# Patient Record
Sex: MALE | Race: WHITE | NOT HISPANIC OR LATINO | Employment: UNEMPLOYED | URBAN - METROPOLITAN AREA
[De-identification: names, ages, dates, MRNs, and addresses within clinical notes are randomized per-mention and may not be internally consistent; named-entity substitution may affect disease eponyms.]

---

## 2017-12-10 ENCOUNTER — HOSPITAL ENCOUNTER (EMERGENCY)
Facility: HOSPITAL | Age: 27
Discharge: HOME | End: 2017-12-10
Attending: EMERGENCY MEDICINE | Admitting: EMERGENCY MEDICINE
Payer: COMMERCIAL

## 2017-12-10 ENCOUNTER — APPOINTMENT (EMERGENCY)
Dept: RADIOLOGY | Facility: HOSPITAL | Age: 27
End: 2017-12-10
Payer: COMMERCIAL

## 2017-12-10 VITALS
OXYGEN SATURATION: 100 % | WEIGHT: 222 LBS | HEART RATE: 95 BPM | TEMPERATURE: 97.9 F | RESPIRATION RATE: 16 BRPM | BODY MASS INDEX: 31.78 KG/M2 | SYSTOLIC BLOOD PRESSURE: 143 MMHG | HEIGHT: 70 IN | DIASTOLIC BLOOD PRESSURE: 85 MMHG

## 2017-12-10 DIAGNOSIS — M54.9 ACUTE BACK PAIN: Primary | ICD-10-CM

## 2017-12-10 PROCEDURE — 72080 X-RAY EXAM THORACOLMB 2/> VW: CPT

## 2017-12-10 PROCEDURE — 99283 EMERGENCY DEPT VISIT LOW MDM: CPT

## 2017-12-10 RX ORDER — CYCLOBENZAPRINE HCL 10 MG
10 TABLET ORAL ONCE
Status: COMPLETED | OUTPATIENT
Start: 2017-12-10 | End: 2017-12-10

## 2017-12-10 RX ORDER — CYCLOBENZAPRINE HCL 10 MG
10 TABLET ORAL 2 TIMES DAILY PRN
Qty: 20 TABLET | Refills: 0 | Status: SHIPPED | OUTPATIENT
Start: 2017-12-10 | End: 2021-03-31

## 2017-12-10 RX ORDER — NAPROXEN 500 MG/1
500 TABLET ORAL 2 TIMES DAILY WITH MEALS
Qty: 30 TABLET | Refills: 0 | Status: SHIPPED | OUTPATIENT
Start: 2017-12-10 | End: 2021-03-31

## 2017-12-10 RX ORDER — NAPROXEN 250 MG/1
500 TABLET ORAL ONCE
Status: COMPLETED | OUTPATIENT
Start: 2017-12-10 | End: 2017-12-10

## 2017-12-10 RX ADMIN — CYCLOBENZAPRINE HYDROCHLORIDE 10 MG: 10 TABLET, FILM COATED ORAL at 13:47

## 2017-12-10 RX ADMIN — NAPROXEN 500 MG: 250 TABLET ORAL at 13:47

## 2017-12-10 NOTE — DISCHARGE INSTRUCTIONS
Acute Low Back Pain   WHAT YOU NEED TO KNOW:   Acute low back pain is sudden discomfort in your lower back area that lasts for up to 6 weeks  The discomfort makes it difficult to tolerate activity  DISCHARGE INSTRUCTIONS:   Return to the emergency department if:   · You have severe pain  · You have sudden stiffness and heaviness on both buttocks down to both legs  · You have numbness or weakness in one leg, or pain in both legs  · You have numbness in your genital area or across your lower back  · You cannot control your urine or bowel movements  Contact your healthcare provider if:   · You have a fever  · You have pain at night or when you rest     · Your pain does not get better with treatment  · You have pain that worsens when you cough or sneeze  · You suddenly feel something pop or snap in your back  · You have questions or concerns about your condition or care  Medicines: The following medicines may be ordered by your healthcare provider:  · Acetaminophen  decreases pain  It is available without a doctor's order  Ask how much to take and how often to take it  Follow directions  Acetaminophen can cause liver damage if not taken correctly  · NSAIDs  help decrease swelling and pain  This medicine is available with or without a doctor's order  NSAIDs can cause stomach bleeding or kidney problems in certain people  If you take blood thinner medicine, always ask your healthcare provider if NSAIDs are safe for you  Always read the medicine label and follow directions  · Prescription pain medicine  may be given  Ask your healthcare provider how to take this medicine safely  · Muscle relaxers  decrease pain by relaxing the muscles in your lower spine  · Take your medicine as directed  Contact your healthcare provider if you think your medicine is not helping or if you have side effects  Tell him of her if you are allergic to any medicine   Keep a list of the medicines, vitamins, and herbs you take  Include the amounts, and when and why you take them  Bring the list or the pill bottles to follow-up visits  Carry your medicine list with you in case of an emergency  Self-care:   · Stay active  as much as you can without causing more pain  Bed rest could make your back pain worse  Start with some light exercises such as walking  Avoid heavy lifting until your pain is gone  Ask for more information about the activities or exercises that are right for you  · Ice  helps decrease swelling, pain, and muscle spams  Put crushed ice in a plastic bag  Cover it with a towel  Place it on your lower back for 20 to 30 minutes every 2 hours  Do this for about 2 to 3 days after your pain starts, or as directed  · Heat  helps decrease pain and muscle spasms  Start to use heat after treatment with ice has stopped  Use a small towel dampened with warm water or a heating pad, or sit in a warm bath  Apply heat on the area for 20 to 30 minutes every 2 hours for as many days as directed  Alternate heat and ice  Prevent acute low back pain:   · Use proper body mechanics  ¨ Bend at the hips and knees when you  objects  Do not bend from the waist  Use your leg muscles as you lift the load  Do not use your back  Keep the object close to your chest as you lift it  Try not to twist or lift anything above your waist     ¨ Change your position often when you stand for long periods of time  Rest one foot on a small box or footrest, and then switch to the other foot often  ¨ Try not to sit for long periods of time  When you do, sit in a straight-backed chair with your feet flat on the floor  Never reach, pull, or push while you are sitting  · Do exercises that strengthen your back muscles  Warm up before you exercise  Ask your healthcare provider the best exercises for you  · Maintain a healthy weight  Ask your healthcare provider how much you should weigh   Ask him to help you create a weight loss plan if you are overweight  Follow up with your healthcare provider as directed:  Return for a follow-up visit if you still have pain after 1 to 3 weeks of treatment  You may need to visit an orthopedist if your back pain lasts more than 12 weeks  Write down your questions so you remember to ask them during your visits  © 2017 [employer]  Information is for End User's use only and may not be sold, redistributed or otherwise used for commercial purposes  All illustrations and images included in CareNotes® are the copyrighted property of [employer]  or [employer]ander [employer]  The above information is an  only  It is not intended as medical advice for individual conditions or treatments  Talk to your doctor, nurse or pharmacist before following any medical regimen to see if it is safe and effective for you

## 2020-05-04 ENCOUNTER — TELEMEDICINE (OUTPATIENT)
Dept: FAMILY MEDICINE CLINIC | Facility: CLINIC | Age: 30
End: 2020-05-04
Payer: COMMERCIAL

## 2020-05-04 VITALS — HEIGHT: 70 IN | BODY MASS INDEX: 30.78 KG/M2 | WEIGHT: 215 LBS | TEMPERATURE: 98.1 F

## 2020-05-04 DIAGNOSIS — F40.9 INSOMNIA DUE TO ANXIETY AND FEAR: Primary | ICD-10-CM

## 2020-05-04 DIAGNOSIS — F51.05 INSOMNIA DUE TO ANXIETY AND FEAR: Primary | ICD-10-CM

## 2020-05-04 PROCEDURE — 3008F BODY MASS INDEX DOCD: CPT | Performed by: NURSE PRACTITIONER

## 2020-05-04 PROCEDURE — 99214 OFFICE O/P EST MOD 30 MIN: CPT | Performed by: NURSE PRACTITIONER

## 2020-05-04 RX ORDER — TRAZODONE HYDROCHLORIDE 50 MG/1
50 TABLET ORAL
Qty: 30 TABLET | Refills: 2 | Status: SHIPPED | OUTPATIENT
Start: 2020-05-04 | End: 2021-03-31

## 2020-08-17 ENCOUNTER — APPOINTMENT (EMERGENCY)
Dept: CT IMAGING | Facility: HOSPITAL | Age: 30
End: 2020-08-17
Payer: COMMERCIAL

## 2020-08-17 ENCOUNTER — HOSPITAL ENCOUNTER (EMERGENCY)
Facility: HOSPITAL | Age: 30
Discharge: HOME | End: 2020-08-17
Attending: EMERGENCY MEDICINE | Admitting: EMERGENCY MEDICINE
Payer: COMMERCIAL

## 2020-08-17 VITALS
SYSTOLIC BLOOD PRESSURE: 134 MMHG | DIASTOLIC BLOOD PRESSURE: 75 MMHG | RESPIRATION RATE: 18 BRPM | OXYGEN SATURATION: 100 % | TEMPERATURE: 98 F | HEART RATE: 52 BPM

## 2020-08-17 DIAGNOSIS — S06.9X9A MINOR HEAD INJURY WITH LOSS OF CONSCIOUSNESS, INITIAL ENCOUNTER (HCC): Primary | ICD-10-CM

## 2020-08-17 PROCEDURE — G1004 CDSM NDSC: HCPCS

## 2020-08-17 PROCEDURE — 70450 CT HEAD/BRAIN W/O DYE: CPT

## 2020-08-17 PROCEDURE — 99284 EMERGENCY DEPT VISIT MOD MDM: CPT

## 2020-08-17 PROCEDURE — 99284 EMERGENCY DEPT VISIT MOD MDM: CPT | Performed by: PHYSICIAN ASSISTANT

## 2020-08-17 NOTE — ED PROVIDER NOTES
History  Chief Complaint   Patient presents with    Dizziness     pt reports he fell and struck head yesterday (-LOC,-thinners,-wounds) pt was not evaluated following injury  Begins today with intermittent bouts of nausea and dizziness     Patient with no past medical history, does states he has had "concussions in the past" presents to emergency department 1 day after he fell few feet off a boat trailer that was pulling out, falling backward and hitting right side of head  Patient denies LOC, was able to go out on the boat for about an hour, and then has been acting normal self ; states symptoms of nausea, no vomiting, dizziness and headache despite Tylenol, persisted today so want to get checked out  Prior to Admission Medications   Prescriptions Last Dose Informant Patient Reported? Taking? cyclobenzaprine (FLEXERIL) 10 mg tablet Not Taking at Unknown time  No No   Sig: Take 1 tablet by mouth 2 (two) times a day as needed for muscle spasms   Patient not taking: Reported on 5/4/2020   naproxen (NAPROSYN) 500 mg tablet Not Taking at Unknown time  No No   Sig: Take 1 tablet by mouth 2 (two) times a day with meals   Patient not taking: Reported on 5/4/2020   traZODone (DESYREL) 50 mg tablet Not Taking at Unknown time  No No   Sig: Take 1 tablet (50 mg total) by mouth daily at bedtime   Patient not taking: Reported on 8/17/2020      Facility-Administered Medications: None       History reviewed  No pertinent past medical history  History reviewed  No pertinent surgical history      Family History   Problem Relation Age of Onset    Diabetes Paternal Grandmother     No Known Problems Mother     No Known Problems Father     No Known Problems Sister     No Known Problems Brother     No Known Problems Son     No Known Problems Daughter     No Known Problems Maternal Grandmother     No Known Problems Maternal Grandfather     No Known Problems Paternal Grandfather     No Known Problems Maternal Aunt  No Known Problems Maternal Uncle     No Known Problems Paternal Aunt     No Known Problems Paternal Uncle     No Known Problems Cousin      I have reviewed and agree with the history as documented  E-Cigarette/Vaping    E-Cigarette Use Current Every Day User      E-Cigarette/Vaping Substances    Nicotine Yes     THC No     CBD No     Flavoring No     Other No     Unknown No      Social History     Tobacco Use    Smoking status: Former Smoker     Packs/day: 0 50     Years: 15 00     Pack years: 7 50    Smokeless tobacco: Never Used    Tobacco comment: Has smoked since age:   16 - As per [place]    Substance Use Topics    Alcohol use: Yes     Comment: Moderate, Alcohol-years of use:   2  - As per Karli Case Drug use: No     Comment: Illicit drugs:   never - As per [place]        Review of Systems   Constitutional: Negative for chills and fever  HENT: Negative for hearing loss, rhinorrhea, sore throat and trouble swallowing  Eyes: Negative for visual disturbance  Respiratory: Negative for cough, chest tightness and shortness of breath  Cardiovascular: Negative for chest pain and leg swelling  Gastrointestinal: Positive for nausea  Negative for abdominal pain and vomiting  Genitourinary: Negative for dysuria and frequency  Musculoskeletal: Negative for arthralgias and myalgias  Skin: Negative for color change and pallor  Neurological: Positive for dizziness, light-headedness and headaches  Negative for weakness  Psychiatric/Behavioral: Negative for behavioral problems  Physical Exam  Physical Exam  Vitals signs and nursing note reviewed  Constitutional:       General: He is not in acute distress  Appearance: Normal appearance  He is well-developed  He is not ill-appearing  HENT:      Head: Normocephalic and atraumatic        Right Ear: External ear normal       Left Ear: External ear normal       Mouth/Throat:      Mouth: Mucous membranes are moist       Pharynx: Oropharynx is clear  Eyes:      Extraocular Movements: Extraocular movements intact  Conjunctiva/sclera: Conjunctivae normal       Pupils: Pupils are equal, round, and reactive to light  Neck:      Musculoskeletal: Normal range of motion  No neck rigidity or muscular tenderness  Cardiovascular:      Rate and Rhythm: Normal rate and regular rhythm  Pulmonary:      Effort: Pulmonary effort is normal       Breath sounds: Normal breath sounds  Abdominal:      General: Bowel sounds are normal       Palpations: Abdomen is soft  Musculoskeletal: Normal range of motion  General: No tenderness  Skin:     General: Skin is warm and dry  Findings: No lesion  Neurological:      General: No focal deficit present  Mental Status: He is alert and oriented to person, place, and time  Mental status is at baseline  Cranial Nerves: No cranial nerve deficit  Motor: No weakness        Gait: Gait normal    Psychiatric:         Mood and Affect: Mood normal          Behavior: Behavior normal          Vital Signs  ED Triage Vitals   Temperature Pulse Respirations Blood Pressure SpO2   08/17/20 1337 08/17/20 1337 08/17/20 1337 08/17/20 1342 08/17/20 1337   98 °F (36 7 °C) 71 18 139/81 98 %      Temp Source Heart Rate Source Patient Position - Orthostatic VS BP Location FiO2 (%)   08/17/20 1337 08/17/20 1337 08/17/20 1337 08/17/20 1337 --   Tympanic Monitor Lying Right arm       Pain Score       --                  Vitals:    08/17/20 1337 08/17/20 1342 08/17/20 1421 08/17/20 1548   BP:  139/81 115/65 134/75   Pulse: 71  (!) 54 (!) 52   Patient Position - Orthostatic VS: Lying Lying           Visual Acuity  Visual Acuity      Most Recent Value   L Pupil Size (mm)  3   R Pupil Size (mm)  3          ED Medications  Medications - No data to display    Diagnostic Studies  Results Reviewed     None                 CT head without contrast   Final Result by Ramon Fowler MD (08/17 1520)      No acute intracranial abnormality  Workstation performed: [id number]                    Procedures  ECG 12 Lead Documentation Only    Date/Time: 8/17/2020 4:08 PM  Performed by: Kel Contreras PA-C  Authorized by: Kel Contreras PA-C     ECG reviewed by me, the ED Provider: yes    Patient location:  ED  Interpretation:     Interpretation: normal    Rate:     ECG rate:  64  Rhythm:     Rhythm: sinus rhythm    Comments:      NSR @ 64 no acute ischemic changes             ED Course       US AUDIT      Most Recent Value   Initial Alcohol Screen: US AUDIT-C    1  How often do you have a drink containing alcohol? 3 Filed at: 08/17/2020 1338   2  How many drinks containing alcohol do you have on a typical day you are drinking? 2 Filed at: 08/17/2020 1338   3a  Male UNDER 65: How often do you have five or more drinks on one occasion? 0 Filed at: 08/17/2020 1338   3b  FEMALE Any Age, or MALE 65+: How often do you have 4 or more drinks on one occassion? 0 Filed at: 08/17/2020 1338   Audit-C Score  5 Filed at: 08/17/2020 1338                  ARCHIE/DAST-10      Most Recent Value   How many times in the past year have you    Used an illegal drug or used a prescription medication for non-medical reasons? Never Filed at: 08/17/2020 1338                                MDM      Disposition  Final diagnoses:   Minor head injury with loss of consciousness, initial encounter [facility])     Time reflects when diagnosis was documented in both MDM as applicable and the Disposition within this note     Time User Action Codes Description Comment    8/17/2020  4:07 PM Trinity Conteh Add [L23 [id number]] Minor head injury with loss of consciousness, initial encounter [facility])       ED Disposition     ED Disposition Condition Date/Time Comment    Discharge Stable Mon Aug 17, 2020  4:07 PM Kayden Lassiter discharge to home/self care              Follow-up Information     Follow up With Specialties Details Why Contact Tino Conroy Tab Cronin Nurse Practitioner, Family Medicine  As needed  [address]  [address] [facility] [address] [phone]            Patient's Medications   Discharge Prescriptions    No medications on file     No discharge procedures on file      PDMP Review     None          ED Provider  Electronically Signed by           Zachary Up PA-C  08/17/20 5265

## 2020-08-18 ENCOUNTER — TELEPHONE (OUTPATIENT)
Dept: FAMILY MEDICINE CLINIC | Facility: CLINIC | Age: 30
End: 2020-08-18

## 2020-08-18 NOTE — TELEPHONE ENCOUNTER
Called pt in regards to his recent ER visit  We were following up to see how he is doing and get him scheduled for a follow up visit in office  L/M   Chris Phan

## 2021-03-31 ENCOUNTER — OFFICE VISIT (OUTPATIENT)
Dept: FAMILY MEDICINE CLINIC | Facility: CLINIC | Age: 31
End: 2021-03-31
Payer: COMMERCIAL

## 2021-03-31 VITALS
SYSTOLIC BLOOD PRESSURE: 130 MMHG | HEIGHT: 70 IN | TEMPERATURE: 97.5 F | DIASTOLIC BLOOD PRESSURE: 90 MMHG | WEIGHT: 248.4 LBS | BODY MASS INDEX: 35.56 KG/M2 | RESPIRATION RATE: 16 BRPM | HEART RATE: 92 BPM | OXYGEN SATURATION: 98 %

## 2021-03-31 DIAGNOSIS — K12.1 STOMATITIS AND MUCOSITIS: ICD-10-CM

## 2021-03-31 DIAGNOSIS — Z72.0 TOBACCO USER: ICD-10-CM

## 2021-03-31 DIAGNOSIS — K12.30 STOMATITIS AND MUCOSITIS: ICD-10-CM

## 2021-03-31 DIAGNOSIS — F19.20 DRUG ABUSE AND DEPENDENCE (HCC): ICD-10-CM

## 2021-03-31 DIAGNOSIS — Z00.00 ENCOUNTER FOR ANNUAL PHYSICAL EXAM: Primary | ICD-10-CM

## 2021-03-31 PROCEDURE — 99395 PREV VISIT EST AGE 18-39: CPT | Performed by: FAMILY MEDICINE

## 2021-03-31 PROCEDURE — 3725F SCREEN DEPRESSION PERFORMED: CPT | Performed by: FAMILY MEDICINE

## 2021-03-31 PROCEDURE — 3008F BODY MASS INDEX DOCD: CPT | Performed by: FAMILY MEDICINE

## 2021-03-31 PROCEDURE — 99213 OFFICE O/P EST LOW 20 MIN: CPT | Performed by: FAMILY MEDICINE

## 2021-03-31 RX ORDER — GABAPENTIN 400 MG/1
400 CAPSULE ORAL 3 TIMES DAILY
COMMUNITY
Start: 2021-03-22 | End: 2021-04-15

## 2021-03-31 RX ORDER — BUPRENORPHINE AND NALOXONE 12; 3 MG/1; MG/1
FILM, SOLUBLE BUCCAL; SUBLINGUAL
COMMUNITY
Start: 2021-03-23 | End: 2021-04-15

## 2021-03-31 NOTE — PROGRESS NOTES
Assessment/Plan:    No problem-specific Assessment & Plan notes found for this encounter  Diagnoses and all orders for this visit:    Encounter for annual physical exam  BMI Counseling: Body mass index is 35 64 kg/m²  The BMI is above normal  Nutrition recommendations include decreasing portion sizes, encouraging healthy choices of fruits and vegetables, decreasing fast food intake, consuming healthier snacks, limiting drinks that contain sugar, moderation in carbohydrate intake and increasing intake of lean protein  Exercise recommendations include exercising 3-5 times per week  No pharmacotherapy was ordered  Depression Screening and Follow-up Plan: Patient with concominant drug abuse who was recently discharged from care of [facility]  Provided resources to [facility] with new counselor/prescriber for his mood disorder and drug addiction/dependence  he declines labs for screening purposes  Immunization History   Administered Date(s) Administered    Tdap 03/01/2012     Vaccines are up to date  Tobacco user  Discussed smoking cessation  Drug abuse and dependence  Patient previously used heroin and fentanyl  Has been clean since April of 2020  Was recently discharged from [facility] where he was receiving suboxone treatment  I called [facility] who provided phone number for [facility] rehab services   Then called [facility] rehab services  They do not prescribe suboxone  Did however give me names and phone numbers for 3 resources who do prescribe--[facility] in [place], [facility] in [place] and [facility] [employer]  Stomatitis and mucositis  -     al mag oxide-diphenhydramine-lidocaine viscous (MAGIC MOUTHWASH) 1:1:1 suspension; Swish and spit 10 mL every 4 (four) hours as needed for mouth pain or discomfort  ?  Traumatic  Will give him rx for magic mouthwash to help alleviate discomfort  Advised patient to call or return to office if this does not heal up as he may need to see oral surgery for biopsy given his smoking history  He is agreeable to this plan  Other orders  -     gabapentin (NEURONTIN) 400 mg capsule; Take 400 mg by mouth 3 (three) times a day  -     buprenorphine-naloxone (Suboxone) 12-3 MG; DISSOLVE 1 FILM UNDER TONGUE ONCE A DAY  -     NON FORMULARY; Medical marijuana          Subjective:      Patient ID: Jann Chu is a 35 y o  male who presents today as a new patient to this office for physical exam and to establish care  The main reason he came today is to get a suboxone rx  Has history of drug abuse, depression, anxiety and PTSD  Previously used both heroin and fentanyl  Clean since April 17 of 2020    He was previously being followed by [facility] but was discharged from their care due to missed appointments  His suboxone and medical marijuana were prescribed through their office  He does not exercise  He does eat fruits and veggies  Is currently not working and lives with his parents who are supportive  He has not had any labs done for screening purposes and declines HIV testing and screening lipid and glucose panels  Had not seen dentist in a long while  Has sore on inside of right cheek  Started yesterday  Is sore  Thinks maybe he bit his cheek  HPI    The following portions of the patient's history were reviewed and updated as appropriate: He  has a past medical history of Anxiety, COVID-19 (06/2020), Depression, Drug abuse and dependence ([facility] [address] ), and PTSD (post-traumatic stress disorder)  He  has no past surgical history on file  He  reports that he has been smoking  He has a 16 00 pack-year smoking history  He has never used smokeless tobacco  He reports current alcohol use of about 3 0 standard drinks of alcohol per week  He reports previous drug use  Drugs: Heroin, Fentanyl, and Marijuana    Current Outpatient Medications on File Prior to Visit   Medication Sig    buprenorphine-naloxone (Suboxone) 12-3 MG DISSOLVE 1 FILM UNDER TONGUE ONCE A DAY    gabapentin (NEURONTIN) 400 mg capsule Take 400 mg by mouth 3 (three) times a day    NON FORMULARY Medical marijuana    [DISCONTINUED] cyclobenzaprine (FLEXERIL) 10 mg tablet Take 1 tablet by mouth 2 (two) times a day as needed for muscle spasms (Patient not taking: Reported on 5/4/2020)    [DISCONTINUED] naproxen (NAPROSYN) 500 mg tablet Take 1 tablet by mouth 2 (two) times a day with meals (Patient not taking: Reported on 5/4/2020)    [DISCONTINUED] traZODone (DESYREL) 50 mg tablet Take 1 tablet (50 mg total) by mouth daily at bedtime (Patient not taking: Reported on 8/17/2020)     No current facility-administered medications on file prior to visit  He has No Known Allergies       Review of Systems      Objective:      /90 (BP Location: Left arm, Patient Position: Sitting, Cuff Size: Large)   Pulse 92   Temp 97 5 °F (36 4 °C) (Temporal)   Resp 16   Ht 5' 10" (1 778 m)   Wt 113 kg (248 lb 6 4 oz)   SpO2 98%   BMI 35 64 kg/m²          Physical Exam  Vitals signs and nursing note reviewed  Constitutional:       General: He is not in acute distress  Appearance: Normal appearance  He is obese  He is not ill-appearing, toxic-appearing or diaphoretic  HENT:      Head: Normocephalic and atraumatic  Right Ear: Tympanic membrane normal       Left Ear: Tympanic membrane normal       Nose: Nose normal       Mouth/Throat:      Mouth: Mucous membranes are moist       Pharynx: No oropharyngeal exudate or posterior oropharyngeal erythema  Comments: Right buccal mucosa with small shallow ulcerated lesion adjacent to back molar  Eyes:      Extraocular Movements: Extraocular movements intact  Conjunctiva/sclera: Conjunctivae normal       Pupils: Pupils are equal, round, and reactive to light  Neck:      Musculoskeletal: Normal range of motion  Cardiovascular:      Rate and Rhythm: Normal rate and regular rhythm        Heart sounds: No murmur  Pulmonary:      Effort: Pulmonary effort is normal       Breath sounds: Normal breath sounds  Abdominal:      General: Bowel sounds are normal       Palpations: Abdomen is soft  There is no mass  Tenderness: There is no abdominal tenderness  Musculoskeletal:      Right lower leg: No edema  Left lower leg: No edema  Lymphadenopathy:      Cervical: No cervical adenopathy  Skin:     General: Skin is warm  Comments: Scabbed lesion left cheek   Neurological:      General: No focal deficit present  Mental Status: He is alert        Deep Tendon Reflexes: Reflexes normal    Psychiatric:         Mood and Affect: Mood normal

## 2021-03-31 NOTE — PATIENT INSTRUCTIONS
[facility] [phone]  UJBXG [id number] ([place]) [phone]  Joanie Amaya) [phone]    For assessment and treatment (do not prescribe suboxone) can call [address] [facility] [phone]

## 2021-04-15 ENCOUNTER — TELEMEDICINE (OUTPATIENT)
Dept: FAMILY MEDICINE CLINIC | Facility: CLINIC | Age: 31
End: 2021-04-15
Payer: COMMERCIAL

## 2021-04-15 VITALS — HEIGHT: 70 IN | BODY MASS INDEX: 35.5 KG/M2 | WEIGHT: 248 LBS | TEMPERATURE: 97.9 F

## 2021-04-15 DIAGNOSIS — R68.83 CHILLS: Primary | ICD-10-CM

## 2021-04-15 DIAGNOSIS — R68.83 CHILLS: ICD-10-CM

## 2021-04-15 PROCEDURE — 99213 OFFICE O/P EST LOW 20 MIN: CPT | Performed by: FAMILY MEDICINE

## 2021-04-15 PROCEDURE — 3008F BODY MASS INDEX DOCD: CPT | Performed by: FAMILY MEDICINE

## 2021-04-15 PROCEDURE — U0005 INFEC AGEN DETEC AMPLI PROBE: HCPCS | Performed by: FAMILY MEDICINE

## 2021-04-15 PROCEDURE — U0003 INFECTIOUS AGENT DETECTION BY NUCLEIC ACID (DNA OR RNA); SEVERE ACUTE RESPIRATORY SYNDROME CORONAVIRUS 2 (SARS-COV-2) (CORONAVIRUS DISEASE [COVID-19]), AMPLIFIED PROBE TECHNIQUE, MAKING USE OF HIGH THROUGHPUT TECHNOLOGIES AS DESCRIBED BY CMS-2020-01-R: HCPCS | Performed by: FAMILY MEDICINE

## 2021-04-15 PROCEDURE — 3725F SCREEN DEPRESSION PERFORMED: CPT | Performed by: FAMILY MEDICINE

## 2021-04-15 NOTE — PROGRESS NOTES
COVID-19 Outpatient Progress Note    Assessment/Plan:    Problem List Items Addressed This Visit     None      Visit Diagnoses     Chills    -  Primary    Relevant Orders    Novel Coronavirus (Covid-19),PCR [facility] - Collected at Mobile Vans or [facility]         Disposition:     I referred patient to one of our centralized sites for a COVID-19 swab  Patient to isolate until his test results  If the test is negative, would recommend he go for some additional labs including cbc and tsh  He is agreeable  I have spent 10 minutes directly with the patient  Encounter provider Kraig Colin DO    Provider located at [address] [place] [id number] [phone]    Recent Visits  No visits were found meeting these conditions  Showing recent visits within past 7 days and meeting all other requirements     Today's Visits  Date Type Provider Dept   04/15/21 Telemedicine Kraig Colin, [address]  today's visits and meeting all other requirements     Future Appointments  No visits were found meeting these conditions  Showing future appointments within next 150 days and meeting all other requirements      This virtual check-in was done via [employer] and patient was informed that this is a secure, HIPAA-compliant platform  He agrees to proceed  Patient agrees to participate in a virtual check in via telephone or video visit instead of presenting to the office to address urgent/immediate medical needs  Patient is aware this is a billable service  After connecting through [facility], the patient was identified by name and date of birth  Rupesh Young was informed that this was a telemedicine visit and that the exam was being conducted confidentially over secure lines  My office door was closed  No one else was in the room   Rupesh Young acknowledged consent and understanding of privacy and security of the telemedicine visit  I informed the patient that I have reviewed his record in Epic and presented the opportunity for him to ask any questions regarding the visit today  The patient agreed to participate  Subjective:   Deepti Rincon is a 35 y o  male who is concerned about COVID-19  Patient's symptoms include chills  Patient denies fever, fatigue, malaise, congestion, rhinorrhea, sore throat, anosmia, loss of taste, cough, shortness of breath, chest tightness, abdominal pain, nausea, vomiting, diarrhea, myalgias and headaches  Date of symptom onset: 4/13/2021    Exposure:   Contact with a person who is under investigation (PUI) for or who is positive for COVID-19 within the last 14 days?: No    Hospitalized recently for fever and/or lower respiratory symptoms?: No      Currently a healthcare worker that is involved in direct patient care?: No      Works in a special setting where the risk of COVID-19 transmission may be high? (this may include long-term care, correctional and FPC facilities; homeless shelters; assisted-living facilities and group homes ): No      Resident in a special setting where the risk of COVID-19 transmission may be high? (this may include long-term care, correctional and FPC facilities; homeless shelters; assisted-living facilities and group homes ): No      Complaining of feeling chilled for the last 2 days  No fever at all  He denies any rhinorrhea, congestion, cough or shortness of breath  No skin rash  No GI sx  Had to leave work because he felt so cold  No covid exposure that he is aware of  Did have covid in June of last year  Did not contact anyone regarding his suboxone  Is doing without and seems to be okay   Chills he is feeling now do not feel like what he has experienced in the past with withdrawal      No results found for: Kimberly Balderas, RODRIGO, [address]  Past Medical History:   Diagnosis Date    Anxiety     COVID-19 06/2020    Depression     Drug abuse and dependence (HCC)     PTSD (post-traumatic stress disorder)      History reviewed  No pertinent surgical history  Current Outpatient Medications   Medication Sig Dispense Refill    NON FORMULARY Medical marijuana       No current facility-administered medications for this visit  No Known Allergies    Review of Systems   Constitutional: Positive for chills  Negative for fatigue and fever  HENT: Negative for congestion, rhinorrhea and sore throat  Respiratory: Negative for cough, chest tightness and shortness of breath  Gastrointestinal: Negative for abdominal pain, diarrhea, nausea and vomiting  Musculoskeletal: Negative for myalgias  Neurological: Negative for headaches  Objective:    Vitals:    04/15/21 1303   Temp: 97 9 °F (36 6 °C)   TempSrc: Temporal   Weight: 112 kg (248 lb)   Height: 5' 10" (1 778 m)       Physical Exam  Nursing note reviewed  Constitutional:       General: He is not in acute distress  Appearance: Normal appearance  He is not ill-appearing, toxic-appearing or diaphoretic  HENT:      Head: Normocephalic and atraumatic  Eyes:      Conjunctiva/sclera: Conjunctivae normal    Pulmonary:      Effort: Pulmonary effort is normal  No respiratory distress  Neurological:      Mental Status: He is alert  Psychiatric:         Mood and Affect: Mood normal        VIRTUAL VISIT DISCLAIMER    Russel Mix acknowledges that he has consented to an online visit or consultation  He understands that the online visit is based solely on information provided by him, and that, in the absence of a face-to-face physical evaluation by the physician, the diagnosis he receives is both limited and provisional in terms of accuracy and completeness  This is not intended to replace a full medical face-to-face evaluation by the physician  Russel Mix understands and accepts these terms

## 2021-04-16 ENCOUNTER — LAB (OUTPATIENT)
Dept: LAB | Facility: HOSPITAL | Age: 31
End: 2021-04-16
Attending: FAMILY MEDICINE
Payer: COMMERCIAL

## 2021-04-16 ENCOUNTER — TELEPHONE (OUTPATIENT)
Dept: FAMILY MEDICINE CLINIC | Facility: CLINIC | Age: 31
End: 2021-04-16

## 2021-04-16 DIAGNOSIS — R68.83 CHILLS (WITHOUT FEVER): Primary | ICD-10-CM

## 2021-04-16 DIAGNOSIS — R68.83 CHILLS (WITHOUT FEVER): ICD-10-CM

## 2021-04-16 LAB
BASOPHILS # BLD AUTO: 0.03 THOUSANDS/ΜL (ref 0–0.1)
BASOPHILS NFR BLD AUTO: 1 % (ref 0–1)
EOSINOPHIL # BLD AUTO: 0.04 THOUSAND/ΜL (ref 0–0.61)
EOSINOPHIL NFR BLD AUTO: 1 % (ref 0–6)
ERYTHROCYTE [DISTWIDTH] IN BLOOD BY AUTOMATED COUNT: 12.5 % (ref 11.6–15.1)
HCT VFR BLD AUTO: 47.7 % (ref 36.5–49.3)
HGB BLD-MCNC: 16.6 G/DL (ref 12–17)
IMM GRANULOCYTES # BLD AUTO: 0.01 THOUSAND/UL (ref 0–0.2)
IMM GRANULOCYTES NFR BLD AUTO: 0 % (ref 0–2)
LYMPHOCYTES # BLD AUTO: 2.08 THOUSANDS/ΜL (ref 0.6–4.47)
LYMPHOCYTES NFR BLD AUTO: 39 % (ref 14–44)
MCH RBC QN AUTO: 29.1 PG (ref 26.8–34.3)
MCHC RBC AUTO-ENTMCNC: 34.8 G/DL (ref 31.4–37.4)
MCV RBC AUTO: 84 FL (ref 82–98)
MONOCYTES # BLD AUTO: 0.37 THOUSAND/ΜL (ref 0.17–1.22)
MONOCYTES NFR BLD AUTO: 7 % (ref 4–12)
NEUTROPHILS # BLD AUTO: 2.8 THOUSANDS/ΜL (ref 1.85–7.62)
NEUTS SEG NFR BLD AUTO: 52 % (ref 43–75)
PLATELET # BLD AUTO: 274 THOUSANDS/UL (ref 149–390)
PMV BLD AUTO: 9.9 FL (ref 8.9–12.7)
RBC # BLD AUTO: 5.7 MILLION/UL (ref 3.88–5.62)
SARS-COV-2 RNA RESP QL NAA+PROBE: NEGATIVE
TSH SERPL DL<=0.05 MIU/L-ACNC: 0.91 UIU/ML (ref 0.34–5.6)
WBC # BLD AUTO: 5.33 THOUSAND/UL (ref 4.31–10.16)

## 2021-04-16 PROCEDURE — 84443 ASSAY THYROID STIM HORMONE: CPT

## 2021-04-16 PROCEDURE — 36415 COLL VENOUS BLD VENIPUNCTURE: CPT

## 2021-04-16 PROCEDURE — 85025 COMPLETE CBC W/AUTO DIFF WBC: CPT

## 2021-04-16 NOTE — RESULT ENCOUNTER NOTE
Please call patient to let him know that his cbc and thyroid were okay  The red blood cell count was very slightly elevated /out of range and is nothing to worry about, nor is it contributing to his chills  I wonder if the chills he feels may be related to his coming off of his suboxone though not typical opiod withdrawal sx

## 2021-04-16 NOTE — RESULT ENCOUNTER NOTE
Spoke to pt made him aware that labs are normal besides the slightly elevated rbc  The chills are most likely due to coming off the suboxone and the pt felt the same   I did make him aware that if anything were to change and he develops more symptoms or does not get better he should go to ER or care now to be evaluated

## 2021-04-16 NOTE — TELEPHONE ENCOUNTER
Patient saw his results on my chart and would like to talk with [facility] about his bloodwork results      Can you return patients phone call at  [phone]

## 2021-04-16 NOTE — RESULT ENCOUNTER NOTE
Called pt and advised that dr Denise Hall does not have any recommendations at this time due to the fact that he only has the chills  She wanted him to have the bloodwork done so she can evaluate his levels and go from there  Pt stated he already had it done   So we will await results and get back to him

## 2021-04-16 NOTE — RESULT ENCOUNTER NOTE
Please let patient know that his covid test was negative  I will order labwork to check this thyroid

## 2021-04-16 NOTE — RESULT ENCOUNTER NOTE
Given that he has no other symptoms other than the chills, I really have no other recommendations at this point    Will await results of cbc and thyroid  If he develops any other symptoms, including fever, should call  If sx worsen over weekend, to urgent care for evaluation

## 2021-07-06 ENCOUNTER — OFFICE VISIT (OUTPATIENT)
Dept: FAMILY MEDICINE CLINIC | Facility: CLINIC | Age: 31
End: 2021-07-06
Payer: COMMERCIAL

## 2021-07-06 VITALS
HEIGHT: 70 IN | RESPIRATION RATE: 16 BRPM | SYSTOLIC BLOOD PRESSURE: 122 MMHG | WEIGHT: 243.2 LBS | DIASTOLIC BLOOD PRESSURE: 100 MMHG | TEMPERATURE: 97.7 F | BODY MASS INDEX: 34.82 KG/M2 | HEART RATE: 78 BPM | OXYGEN SATURATION: 98 %

## 2021-07-06 DIAGNOSIS — B35.3 TINEA PEDIS OF BOTH FEET: Primary | ICD-10-CM

## 2021-07-06 DIAGNOSIS — F19.20 DRUG ABUSE AND DEPENDENCE (HCC): ICD-10-CM

## 2021-07-06 DIAGNOSIS — Z72.0 TOBACCO USER: ICD-10-CM

## 2021-07-06 DIAGNOSIS — R03.0 ELEVATED BLOOD PRESSURE READING WITHOUT DIAGNOSIS OF HYPERTENSION: ICD-10-CM

## 2021-07-06 PROCEDURE — 3008F BODY MASS INDEX DOCD: CPT | Performed by: FAMILY MEDICINE

## 2021-07-06 PROCEDURE — 3725F SCREEN DEPRESSION PERFORMED: CPT | Performed by: FAMILY MEDICINE

## 2021-07-06 PROCEDURE — 99213 OFFICE O/P EST LOW 20 MIN: CPT | Performed by: FAMILY MEDICINE

## 2021-07-06 RX ORDER — BUPRENORPHINE HYDROCHLORIDE AND NALOXONE HYDROCHLORIDE DIHYDRATE 8; 2 MG/1; MG/1
1 TABLET SUBLINGUAL 2 TIMES DAILY
COMMUNITY
End: 2022-06-14

## 2021-07-06 NOTE — PROGRESS NOTES
Assessment/Plan:    No problem-specific Assessment & Plan notes found for this encounter  Diagnoses and all orders for this visit:    Tinea pedis of both feet  -     econazole nitrate 1 % cream; Apply topically daily  Keep feet clean and dry  Apply econazole once daily to feet for 2 weeks  Drug abuse and dependence [facility])  Will reprint the list of resources for him that I had given him at visit in March  I also advised him to call phone number on back of his insurance card where it says "for mental health and substance abuse" to get list of participating providers  Tobacco user  Discussed smoking cessation  Elevated blood pressure reading without diagnosis of hypertension  bp elevated in office this AM    Low sodium diet  Decrease alcohol consumption  Recheck bp in 1 month  Other orders  -     buprenorphine-naloxone (SUBOXONE) 8-2 mg per SL tablet; Place 1 tablet under the tongue daily          Subjective:      Patient ID: Shira Fong is a 35 y o  male with history of drug (heroin and fentanyl) abuse, depression, anxiety, PTSD here today for complaint of "athlete's foot"  Feet dry, itchy and peeling between toes and on bottom of feet  Ongoing for quite awhile  Wears black socks and boots to work every day  Using otc lotrimin  Seen as new patient for PE in March at which time he reported that he had previously been following with [facility] for suboxone prescriptions but was discharged from their care due to missed appointments  He declined screening labs including HIV testing  He was provided with resources for new psychiatrist for his mood disorder and substance abuse  He states that he has called a couple of places but the wait time was too long so he just never made an appointment  Is currently getting suboxone "off the street"  Wants phone numbers again  Had soreness in his mouth when I saw him last time  It resolved entirely       HPI    The following portions of the patient's history were reviewed and updated as appropriate:   He  has a past medical history of Anxiety, COVID-19 (06/2020), Depression, Drug abuse and dependence ([facility] [address] ), and PTSD (post-traumatic stress disorder)  He  has no past surgical history on file  He  reports that he has been smoking  He has a 16 00 pack-year smoking history  He has never used smokeless tobacco  He reports current alcohol use of about 3 0 standard drinks of alcohol per week  He reports previous drug use  Drugs: Heroin, Fentanyl, and Marijuana  Current Outpatient Medications on File Prior to Visit   Medication Sig    buprenorphine-naloxone (SUBOXONE) 8-2 mg per SL tablet Place 1 tablet under the tongue daily   [address] marijuana     No current facility-administered medications on file prior to visit  He has No Known Allergies       Review of Systems      Objective:      /100 (BP Location: Left arm, Patient Position: Sitting, Cuff Size: Large)   Pulse 78   Temp 97 7 °F (36 5 °C) (Temporal)   Resp 16   Ht 5' 10" (1 778 m)   Wt 110 kg (243 lb 3 2 oz)   SpO2 98%   BMI 34 90 kg/m²          Physical Exam  Vitals and nursing note reviewed  Constitutional:       General: He is not in acute distress  Appearance: Normal appearance  He is not ill-appearing, toxic-appearing or diaphoretic  HENT:      Head: Normocephalic  Cardiovascular:      Rate and Rhythm: Normal rate and regular rhythm  Pulses: Normal pulses  Pulmonary:      Effort: Pulmonary effort is normal       Breath sounds: Normal breath sounds  Musculoskeletal:      Cervical back: Normal range of motion  Lymphadenopathy:      Cervical: No cervical adenopathy  Skin:     Comments: Skin between toes bilateral feet dry and peeling  No rash on plantar surface of feet   Neurological:      General: No focal deficit present  Mental Status: He is alert     Psychiatric:         Mood and Affect: Mood normal

## 2021-08-03 ENCOUNTER — OFFICE VISIT (OUTPATIENT)
Dept: FAMILY MEDICINE CLINIC | Facility: CLINIC | Age: 31
End: 2021-08-03
Payer: COMMERCIAL

## 2021-08-03 VITALS
HEART RATE: 84 BPM | TEMPERATURE: 98.2 F | SYSTOLIC BLOOD PRESSURE: 122 MMHG | BODY MASS INDEX: 34.33 KG/M2 | HEIGHT: 70 IN | WEIGHT: 239.8 LBS | RESPIRATION RATE: 16 BRPM | OXYGEN SATURATION: 99 % | DIASTOLIC BLOOD PRESSURE: 90 MMHG

## 2021-08-03 DIAGNOSIS — B07.0 PLANTAR WART OF BOTH FEET: ICD-10-CM

## 2021-08-03 DIAGNOSIS — R03.0 ELEVATED BLOOD-PRESSURE READING, WITHOUT DIAGNOSIS OF HYPERTENSION: Primary | ICD-10-CM

## 2021-08-03 PROCEDURE — 3008F BODY MASS INDEX DOCD: CPT | Performed by: FAMILY MEDICINE

## 2021-08-03 PROCEDURE — 3725F SCREEN DEPRESSION PERFORMED: CPT | Performed by: FAMILY MEDICINE

## 2021-08-03 PROCEDURE — 99213 OFFICE O/P EST LOW 20 MIN: CPT | Performed by: FAMILY MEDICINE

## 2021-08-03 NOTE — PROGRESS NOTES
Assessment/Plan:    No problem-specific Assessment & Plan notes found for this encounter  Diagnoses and all orders for this visit:    Elevated blood-pressure reading, without diagnosis of hypertension  bp improved  Continue efforts at lifestyle modification (reduction of sodium in diet, exercise)  Plantar wart of both feet  -     Ambulatory referral to Podiatry; Future  Referral to podiatry for large plantar warts both feet  Subjective:      Patient ID: Lorraine Stiles is a 35 y o  male here today for f/u on his blood pressure  He was seen on 7/6/21 for complaint of athlete's foot and bp was noted to be elevated at 122/100  He was advised to follow low sodium diet, decrease alcohol consumption and return in 1 month for bp check  Admits that he has cut down on etoh consumption but still using a lot of salt in diet    Was able to get on wait list at [facility] and was told they will reach out to him in a week or two  States that the athlete's foot resolved entirely  States that he has some callous on bottom of feet that bother him when he walks  HPI    The following portions of the patient's history were reviewed and updated as appropriate:   He  has a past medical history of Anxiety, COVID-19 (06/2020), Depression, Drug abuse and dependence ([facility] [address] ), and PTSD (post-traumatic stress disorder)  He  has no past surgical history on file  He  reports that he has been smoking  He has a 16 00 pack-year smoking history  He has never used smokeless tobacco  He reports current alcohol use of about 3 0 standard drinks of alcohol per week  He reports previous drug use  Drugs: Heroin, Fentanyl, and Marijuana    Current Outpatient Medications on File Prior to Visit   Medication Sig    buprenorphine-naloxone (SUBOXONE) 8-2 mg per SL tablet Place 1 tablet under the tongue daily    NON FORMULARY Medical marijuana    econazole nitrate 1 % cream Apply topically daily (Patient not taking: Reported on 8/3/2021)     No current facility-administered medications on file prior to visit  He has No Known Allergies       Review of Systems      Objective:      /90 (BP Location: Left arm, Patient Position: Sitting, Cuff Size: Large)   Pulse 84   Temp 98 2 °F (36 8 °C) (Temporal)   Resp 16   Ht 5' 10" (1 778 m)   Wt 109 kg (239 lb 12 8 oz)   SpO2 99%   BMI 34 41 kg/m²          Physical Exam  Vitals and nursing note reviewed  Constitutional:       Appearance: Normal appearance  He is obese  HENT:      Head: Normocephalic and atraumatic  Eyes:      Conjunctiva/sclera: Conjunctivae normal    Cardiovascular:      Rate and Rhythm: Normal rate and regular rhythm  Pulmonary:      Effort: Pulmonary effort is normal       Breath sounds: Normal breath sounds  Musculoskeletal:      Cervical back: Normal range of motion  Skin:     Comments: Bilateral feet with verruca and overlying callous on plantar surface at distal 4th metatarsal    Neurological:      General: No focal deficit present  Mental Status: He is alert     Psychiatric:         Mood and Affect: Mood normal

## 2021-08-06 ENCOUNTER — TELEPHONE (OUTPATIENT)
Dept: FAMILY MEDICINE CLINIC | Facility: CLINIC | Age: 31
End: 2021-08-06

## 2021-08-06 NOTE — TELEPHONE ENCOUNTER
Pt came in for appt mistakenly was supposed to be at Dr Nuvia Tolliver office today   I called and rescheduled him for Weds at [facility]

## 2022-04-07 NOTE — ED PROVIDER NOTES
History  Chief Complaint   Patient presents with    Back Pain     Patient presents with lower back pain, denies injury  21-year-old male comes in complaining of back pain  Patient denies any injury however uses 2 days ago he began to have mid back pain that radiates into his lower back  No loss of bowel or bladder function pain does not radiate down the legs  Patient has no history of the same  Took some ibuprofen with some mild relief  Of note patient is standing up watching TV and walking around the room when I enter  Patient does not appear to be in any distress at all  History provided by:  Patient, parent and significant other   used: No    Back Pain   Location:  Lumbar spine  Quality:  Aching  Radiates to:  Does not radiate  Pain severity:  Mild  Pain is:  Same all the time  Onset quality:  Sudden  Duration:  2 days  Timing:  Constant  Progression:  Unchanged  Chronicity:  New  Context: not falling, not occupational injury and not twisting    Ineffective treatments:  Ibuprofen  Associated symptoms: no abdominal pain, no bladder incontinence, no bowel incontinence, no chest pain, no fever, no headaches, no pelvic pain, no perianal numbness, no tingling and no weakness    Risk factors: no hx of cancer, not obese and no vascular disease        None       History reviewed  No pertinent past medical history  History reviewed  No pertinent surgical history  History reviewed  No pertinent family history  I have reviewed and agree with the history as documented  Social History   Substance Use Topics    Smoking status: Current Every Day Smoker    Smokeless tobacco: Never Used    Alcohol use Yes        Review of Systems   Constitutional: Negative for activity change, appetite change and fever  HENT: Negative for congestion and facial swelling  Eyes: Negative for discharge and redness  Respiratory: Negative for cough and wheezing      Cardiovascular: Negative for chest pain and leg swelling  Gastrointestinal: Negative for abdominal distention, abdominal pain, blood in stool and bowel incontinence  Endocrine: Negative for cold intolerance and polydipsia  Genitourinary: Negative for bladder incontinence, difficulty urinating, hematuria and pelvic pain  Musculoskeletal: Positive for back pain  Negative for arthralgias and gait problem  Skin: Negative for color change and rash  Allergic/Immunologic: Negative for food allergies and immunocompromised state  Neurological: Negative for dizziness, tingling, seizures, weakness and headaches  Hematological: Negative for adenopathy  Does not bruise/bleed easily  Psychiatric/Behavioral: Negative for agitation, confusion and decreased concentration  All other systems reviewed and are negative  Physical Exam  ED Triage Vitals [12/10/17 1133]   Temperature Pulse Respirations Blood Pressure SpO2   97 9 °F (36 6 °C) 95 16 143/85 100 %      Temp Source Heart Rate Source Patient Position - Orthostatic VS BP Location FiO2 (%)   Oral Monitor -- -- --      Pain Score       8           Orthostatic Vital Signs  Vitals:    12/10/17 1133   BP: 143/85   Pulse: 95       Physical Exam   Constitutional: He is oriented to person, place, and time  He appears well-developed and well-nourished  Non-toxic appearance  HENT:   Head: Normocephalic and atraumatic  Right Ear: Tympanic membrane normal    Left Ear: Tympanic membrane normal    Nose: Nose normal    Mouth/Throat: Oropharynx is clear and moist    Eyes: Conjunctivae, EOM and lids are normal  Pupils are equal, round, and reactive to light  Neck: Trachea normal and normal range of motion  Neck supple  No JVD present  Carotid bruit is not present  Cardiovascular: Normal rate, regular rhythm, normal heart sounds and intact distal pulses  No extrasystoles are present  Pulmonary/Chest: Effort normal  He has no decreased breath sounds  He has no wheezes  He has no rhonchi  He has no rales  He exhibits no tenderness and no deformity  Abdominal: Soft  Bowel sounds are normal  There is no tenderness  There is no rebound, no guarding and no CVA tenderness  Musculoskeletal:        Right shoulder: He exhibits normal range of motion, no tenderness, no swelling and no deformity  Cervical back: Normal  He exhibits normal range of motion, no tenderness, no bony tenderness and no deformity  Lymphadenopathy:     He has no cervical adenopathy  He has no axillary adenopathy  Neurological: He is alert and oriented to person, place, and time  He has normal strength and normal reflexes  No cranial nerve deficit or sensory deficit  He displays a negative Romberg sign  Skin: Skin is warm and dry  Psychiatric: He has a normal mood and affect  His speech is normal and behavior is normal  Judgment and thought content normal  Cognition and memory are normal    Nursing note and vitals reviewed  ED Medications  Medications   cyclobenzaprine (FLEXERIL) tablet 10 mg (10 mg Oral Given 12/10/17 1347)   naproxen (NAPROSYN) tablet 500 mg (500 mg Oral Given 12/10/17 1347)       Diagnostic Studies  Results Reviewed     None                 XR spine thoracolumbar 2 vw   Final Result by Evy Burnett MD (12/10 1430)      No fracture or subluxation           Workstation performed: [id number]                    Procedures  Procedures       Phone Contacts  ED Phone Contact    ED Course  ED Course                                MDM  Number of Diagnoses or Management Options  Acute back pain: new and requires workup     Amount and/or Complexity of Data Reviewed  Tests in the radiology section of CPT®: ordered and reviewed  Independent visualization of images, tracings, or specimens: yes (X-ray within normal limits no fracture no dislocation)    Patient Progress  Patient progress: improved    CritCare Time    Disposition  Final diagnoses:   Acute back pain     Time reflects when diagnosis was documented in both MDM as applicable and the Disposition within this note     Time User Action Codes Description Comment    12/10/2017  1:47 PM Viridiana Uche JONA Add [M54 9] Acute back pain       ED Disposition     ED Disposition Condition Comment    Discharge  Norman Linton discharge to home/self care  Condition at discharge: Good        Follow-up Information     Follow up With Specialties Details Why Contact Info    your doctor  Schedule an appointment as soon as possible for a visit          Discharge Medication List as of 12/10/2017  1:50 PM      START taking these medications    Details   cyclobenzaprine (FLEXERIL) 10 mg tablet Take 1 tablet by mouth 2 (two) times a day as needed for muscle spasms, Starting Sun 12/10/2017, Print      naproxen (NAPROSYN) 500 mg tablet Take 1 tablet by mouth 2 (two) times a day with meals, Starting Sun 12/10/2017, Print           No discharge procedures on file      ED Provider  Electronically Signed by           Melvina Hayes DO  12/16/17 1617 follow up with PCP within 1 week,   Phone: (   )    -  Fax: (   )    -  Follow Up Time:     Thoracic team follow up within 1-2 weeks,   Phone: (   )    -  Fax: (   )    -  Follow Up Time:    Zenia Shannon)  Surgery; Thoracic Surgery  [address] [place] [address]  Phone: [phone]  Fax: [phone]  Established Patient  Follow Up Time: 2 weeks

## 2022-04-28 ENCOUNTER — TELEPHONE (OUTPATIENT)
Dept: FAMILY MEDICINE CLINIC | Facility: CLINIC | Age: 32
End: 2022-04-28

## 2022-06-14 ENCOUNTER — OFFICE VISIT (OUTPATIENT)
Dept: FAMILY MEDICINE CLINIC | Facility: CLINIC | Age: 32
End: 2022-06-14
Payer: MEDICARE

## 2022-06-14 ENCOUNTER — TELEPHONE (OUTPATIENT)
Dept: FAMILY MEDICINE CLINIC | Facility: CLINIC | Age: 32
End: 2022-06-14

## 2022-06-14 VITALS
BODY MASS INDEX: 32.5 KG/M2 | SYSTOLIC BLOOD PRESSURE: 130 MMHG | OXYGEN SATURATION: 97 % | HEART RATE: 100 BPM | RESPIRATION RATE: 16 BRPM | DIASTOLIC BLOOD PRESSURE: 90 MMHG | HEIGHT: 70 IN | TEMPERATURE: 98.7 F | WEIGHT: 227 LBS

## 2022-06-14 DIAGNOSIS — R53.83 FATIGUE, UNSPECIFIED TYPE: ICD-10-CM

## 2022-06-14 DIAGNOSIS — F41.8 MIXED ANXIETY AND DEPRESSIVE DISORDER: Primary | ICD-10-CM

## 2022-06-14 PROCEDURE — 99214 OFFICE O/P EST MOD 30 MIN: CPT | Performed by: FAMILY MEDICINE

## 2022-06-14 RX ORDER — HYDROXYZINE HYDROCHLORIDE 25 MG/1
25 TABLET, FILM COATED ORAL EVERY 6 HOURS PRN
Qty: 30 TABLET | Refills: 3 | Status: SHIPPED | OUTPATIENT
Start: 2022-06-14
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
## 2022-06-14 NOTE — PROGRESS NOTES
BMI Counseling: Body mass index is 32 57 kg/m²  The BMI is above normal  Nutrition recommendations include decreasing portion sizes, encouraging healthy choices of fruits and vegetables, consuming healthier snacks and moderation in carbohydrate intake  Exercise recommendations include exercising 3-5 times per week  No pharmacotherapy was ordered  Rationale for BMI follow-up plan is due to patient being overweight or obese  Assessment/Plan:         Problem List Items Addressed This Visit        Other    Mixed anxiety and depressive disorder - Primary     Pt has had symptoms for a long time and is trying to get in with Psychiatrist/therapist  Will start zoloft 50 mg qd and hydroxyzine 25 mg as needed for anxiety  Pt referred to Psychiatrist             Relevant Medications    sertraline (Zoloft) 50 mg tablet    hydrOXYzine HCL (ATARAX) 25 mg tablet    Other Relevant Orders    Ambulatory Referral to Psychiatry    Ambulatory Referral to Psychology    Fatigue     Will check labs  Relevant Orders    CBC and differential    Comprehensive metabolic panel    TSH, 3rd generation with Free T4 reflex            Subjective:      Patient ID: Chirag Mccarthy is a 28 y o  male  Pt here for mental evaluation  Pt has history of mood swings and has history of drug abuse in the past  Pt is accompanied by his mother  Pt has never seen Psychiatrist in the past and has never been on Psych meds  Pt states that moods are up and down  Mind races and has hard time sleeping  Has nightmares at times  Hasn't had job in long time  Not suicidal or homicidal  Pt also has been fatigued  No recent labs  Depression  Associated symptoms include fatigue  Pertinent negatives include no abdominal pain, chest pain, headaches, nausea or vomiting         The following portions of the patient's history were reviewed and updated as appropriate:   Past Medical History:  He has a past medical history of Anxiety, COVID-19 (06/2020), Depression, Drug abuse and dependence ([address] ), and PTSD (post-traumatic stress disorder)  ,  _______________________________________________________________________  Medical Problems:  does not have any pertinent problems on file ,  _______________________________________________________________________  Past Surgical History:   has no past surgical history on file ,  _______________________________________________________________________  Family History:  family history includes Diabetes in his paternal grandmother; No Known Problems in his brother, cousin, father, maternal aunt, maternal grandfather, maternal grandmother, maternal uncle, mother, paternal aunt, paternal grandfather, paternal uncle, and sister  ,  _______________________________________________________________________  Social History:   reports that he has been smoking  He has a 16 00 pack-year smoking history  He has never used smokeless tobacco  He reports current alcohol use of about 3 0 standard drinks of alcohol per week  He reports previous drug use  Drugs: Heroin, Fentanyl, and Marijuana  ,  _______________________________________________________________________  Allergies:  has No Known Allergies     _______________________________________________________________________  Current Outpatient Medications   Medication Sig Dispense Refill    hydrOXYzine HCL (ATARAX) 25 mg tablet Take 1 tablet (25 mg total) by mouth every 6 (six) hours as needed for anxiety 30 tablet 3    sertraline (Zoloft) 50 mg tablet Take 1 tablet (50 mg total) by mouth daily 30 tablet 3     No current facility-administered medications for this visit      _______________________________________________________________________  Review of Systems   Constitutional: Positive for fatigue  Negative for activity change and appetite change  Respiratory: Negative for chest tightness and shortness of breath  Cardiovascular: Negative for chest pain and palpitations     Gastrointestinal: Negative for abdominal pain, diarrhea, nausea and vomiting  Neurological: Negative for dizziness, tremors, light-headedness and headaches  Psychiatric/Behavioral: Positive for decreased concentration, depression, dysphoric mood and sleep disturbance  Negative for agitation, self-injury and suicidal ideas  The patient is nervous/anxious  Objective:  Vitals:    06/14/22 0908   BP: 130/90   BP Location: Left arm   Patient Position: Sitting   Cuff Size: Large   Pulse: 100   Resp: 16   Temp: 98 7 °F (37 1 °C)   TempSrc: Temporal   SpO2: 97%   Weight: 103 kg (227 lb)   Height: 5' 10" (1 778 m)     Body mass index is 32 57 kg/m²  Physical Exam  Vitals and nursing note reviewed  Constitutional:       Appearance: Normal appearance  He is well-developed  Neck:      Thyroid: No thyromegaly  Cardiovascular:      Rate and Rhythm: Normal rate and regular rhythm  Heart sounds: Normal heart sounds  No murmur heard  Pulmonary:      Effort: Pulmonary effort is normal  No respiratory distress  Breath sounds: Normal breath sounds  No wheezing  Musculoskeletal:      Cervical back: Normal range of motion and neck supple  Right lower leg: No edema  Left lower leg: No edema  Lymphadenopathy:      Cervical: No cervical adenopathy  Neurological:      Mental Status: He is alert and oriented to person, place, and time  Cranial Nerves: No cranial nerve deficit  Psychiatric:         Behavior: Behavior normal          Thought Content:  Thought content normal          Judgment: Judgment normal       Comments: Pt has flat affect today

## 2022-06-14 NOTE — ASSESSMENT & PLAN NOTE
Pt has had symptoms for a long time and is trying to get in with Psychiatrist/therapist  Will start zoloft 50 mg qd and hydroxyzine 25 mg as needed for anxiety   Pt referred to Psychiatrist

## 2022-06-15 ENCOUNTER — TELEPHONE (OUTPATIENT)
Dept: PSYCHIATRY | Facility: CLINIC | Age: 32
End: 2022-06-15

## 2022-06-20 ENCOUNTER — TELEPHONE (OUTPATIENT)
Dept: PSYCHIATRY | Facility: CLINIC | Age: 32
End: 2022-06-20

## 2022-06-28 ENCOUNTER — TELEPHONE (OUTPATIENT)
Dept: PSYCHIATRY | Facility: CLINIC | Age: 32
End: 2022-06-28

## 2022-08-24 ENCOUNTER — OFFICE VISIT (OUTPATIENT)
Dept: FAMILY MEDICINE CLINIC | Facility: CLINIC | Age: 32
End: 2022-08-24
Payer: MEDICARE

## 2022-08-24 VITALS
OXYGEN SATURATION: 98 % | TEMPERATURE: 97.2 F | WEIGHT: 244 LBS | BODY MASS INDEX: 34.93 KG/M2 | HEART RATE: 101 BPM | HEIGHT: 70 IN | RESPIRATION RATE: 16 BRPM | SYSTOLIC BLOOD PRESSURE: 122 MMHG | DIASTOLIC BLOOD PRESSURE: 82 MMHG

## 2022-08-24 DIAGNOSIS — L84 CALLUS OF FOOT: ICD-10-CM

## 2022-08-24 DIAGNOSIS — F41.8 MIXED ANXIETY AND DEPRESSIVE DISORDER: Primary | ICD-10-CM

## 2022-08-24 PROCEDURE — 99213 OFFICE O/P EST LOW 20 MIN: CPT | Performed by: FAMILY MEDICINE

## 2022-08-24 RX ORDER — DEXTROAMPHETAMINE SACCHARATE, AMPHETAMINE ASPARTATE, DEXTROAMPHETAMINE SULFATE AND AMPHETAMINE SULFATE 5; 5; 5; 5 MG/1; MG/1; MG/1; MG/1
1 TABLET ORAL 3 TIMES DAILY
COMMUNITY
Start: 2022-08-15

## 2022-08-24 RX ORDER — CARIPRAZINE 3 MG/1
3 CAPSULE, GELATIN COATED ORAL EVERY MORNING
COMMUNITY
Start: 2022-08-03

## 2022-08-24 RX ORDER — BENZTROPINE MESYLATE 1 MG/1
TABLET ORAL
COMMUNITY
Start: 2022-07-21 | End: 2022-08-24

## 2022-08-24 RX ORDER — LURASIDONE HYDROCHLORIDE 60 MG/1
1 TABLET, FILM COATED ORAL
COMMUNITY
Start: 2022-06-27 | End: 2022-08-24

## 2022-08-24 RX ORDER — CLONAZEPAM 1 MG/1
1 TABLET ORAL 3 TIMES DAILY PRN
COMMUNITY
Start: 2022-08-06

## 2022-08-24 NOTE — ASSESSMENT & PLAN NOTE
Pt has been seeing Psychiatry for past few months and currently takes vraylar 3 mg qd, adderall 20 mg tid, and clonazepam 1 mg as needed  Mood is improving but still anxious  Started seeing therapist last week and will see him weekly

## 2022-08-24 NOTE — ASSESSMENT & PLAN NOTE
Pt has had pain in left foot for past year off and on  Likely due to large callus compressing underlying nerve  Pt to f/u with his Podiatrist for management

## 2022-08-24 NOTE — PROGRESS NOTES
Assessment/Plan:         Problem List Items Addressed This Visit        Musculoskeletal and Integument    Callus of foot     Pt has had pain in left foot for past year off and on  Likely due to large callus compressing underlying nerve  Pt to f/u with his Podiatrist for management  Other    Mixed anxiety and depressive disorder - Primary     Pt has been seeing Psychiatry for past few months and currently takes vraylar 3 mg qd, adderall 20 mg tid, and clonazepam 1 mg as needed  Mood is improving but still anxious  Started seeing therapist last week and will see him weekly  Relevant Medications    amphetamine-dextroamphetamine (ADDERALL) 20 mg tablet    Vraylar 3 MG capsule            Subjective:      Patient ID: Nery Donaldson is a 28 y o  male  Pt here for f/u anxiety and depression  Pt seeing Psychiatrist now and meds changed to vraylar 3 mg qd, adderall 20 mg tid, and clonazepam as needed  Mood is a little better but still anxious  Started seeing therapist last week and will see weekly  Sleeping ok  No cp/sob  No palpitations  Pt has been having pain in feet and worse on left side  Has had it for past year off and on  Has numbness at times  The following portions of the patient's history were reviewed and updated as appropriate:   Past Medical History:  He has a past medical history of Anxiety, COVID-19 (06/2020), Depression, Drug abuse and dependence ([facility] [address] ), and PTSD (post-traumatic stress disorder)  ,  _______________________________________________________________________  Medical Problems:  does not have any pertinent problems on file ,  _______________________________________________________________________  Past Surgical History:   has no past surgical history on file ,  _______________________________________________________________________  Family History:  family history includes Diabetes in his paternal grandmother; No Known Problems in his brother, cousin, father, maternal aunt, maternal grandfather, maternal grandmother, maternal uncle, mother, paternal aunt, paternal grandfather, paternal uncle, and sister  ,  _______________________________________________________________________  Social History:   reports that he has been smoking  He has a 16 00 pack-year smoking history  He has never used smokeless tobacco  He reports current alcohol use of about 3 0 standard drinks of alcohol per week  He reports previous drug use  Drugs: Heroin, Fentanyl, and Marijuana  ,  _______________________________________________________________________  Allergies:  has No Known Allergies     _______________________________________________________________________  Current Outpatient Medications   Medication Sig Dispense Refill    amphetamine-dextroamphetamine (ADDERALL) 20 mg tablet Take 1 tablet by mouth 3 (three) times a day      clonazePAM (KlonoPIN) 1 mg tablet 1 mg 3 (three) times a day as needed      Vraylar 3 MG capsule 3 mg every morning       No current facility-administered medications for this visit      _______________________________________________________________________  Review of Systems   Constitutional: Negative for activity change, appetite change and fatigue  Respiratory: Negative for chest tightness and shortness of breath  Cardiovascular: Negative for chest pain and palpitations  Gastrointestinal: Negative for abdominal pain, diarrhea, nausea and vomiting  Musculoskeletal:        Pain in feet   Neurological: Positive for numbness  Negative for dizziness, tremors, light-headedness and headaches  Psychiatric/Behavioral: Positive for dysphoric mood  Negative for agitation, decreased concentration, self-injury, sleep disturbance and suicidal ideas  The patient is nervous/anxious            Objective:  Vitals:    08/24/22 0946   BP: 122/82   BP Location: Left arm   Patient Position: Sitting   Cuff Size: Standard   Pulse: 101   Resp: 16   Temp: (!) 97 2 °F (36 2 °C)   TempSrc: Temporal   SpO2: 98%   Weight: 111 kg (244 lb)   Height: 5' 10" (1 778 m)     Body mass index is 35 01 kg/m²  Physical Exam  Vitals and nursing note reviewed  Constitutional:       Appearance: Normal appearance  He is well-developed  He is obese  Neck:      Thyroid: No thyromegaly  Cardiovascular:      Rate and Rhythm: Normal rate and regular rhythm  Heart sounds: Normal heart sounds  No murmur heard  Pulmonary:      Effort: Pulmonary effort is normal  No respiratory distress  Breath sounds: Normal breath sounds  No wheezing  Musculoskeletal:      Cervical back: Normal range of motion and neck supple  Right lower leg: No edema  Left lower leg: No edema  Comments: Tender callus on plantar surface at base of left 4th toe   Lymphadenopathy:      Cervical: No cervical adenopathy  Neurological:      Mental Status: He is alert and oriented to person, place, and time  Cranial Nerves: No cranial nerve deficit  Psychiatric:         Mood and Affect: Mood normal          Behavior: Behavior normal          Thought Content:  Thought content normal          Judgment: Judgment normal

## 2022-11-07 ENCOUNTER — TELEPHONE (OUTPATIENT)
Dept: PSYCHIATRY | Facility: CLINIC | Age: 32
End: 2022-11-07

## 2022-11-07 NOTE — TELEPHONE ENCOUNTER
Pt's mother called to check the status of the wait list  Writer informed her that there are no openings yet and someone will call her as soon as there is an opening

## 2023-03-09 ENCOUNTER — TELEPHONE (OUTPATIENT)
Dept: PSYCHIATRY | Facility: CLINIC | Age: 33
End: 2023-03-09